# Patient Record
Sex: FEMALE | Race: BLACK OR AFRICAN AMERICAN
[De-identification: names, ages, dates, MRNs, and addresses within clinical notes are randomized per-mention and may not be internally consistent; named-entity substitution may affect disease eponyms.]

---

## 2017-06-29 ENCOUNTER — HOSPITAL ENCOUNTER (INPATIENT)
Dept: HOSPITAL 25 - MCHOBOUT | Age: 22
LOS: 3 days | Discharge: HOME | DRG: 560 | End: 2017-07-02
Attending: MIDWIFE | Admitting: MIDWIFE
Payer: COMMERCIAL

## 2017-06-29 DIAGNOSIS — Z3A.40: ICD-10-CM

## 2017-06-29 DIAGNOSIS — O48.0: Primary | ICD-10-CM

## 2017-06-29 DIAGNOSIS — Z91.018: ICD-10-CM

## 2017-06-29 LAB
HCT VFR BLD AUTO: 33 % (ref 35–47)
HGB BLD-MCNC: 10.6 G/DL (ref 12–16)
MCH RBC QN AUTO: 26 PG (ref 27–31)
MCHC RBC AUTO-ENTMCNC: 32 G/DL (ref 31–36)
MCV RBC AUTO: 81 FL (ref 80–97)
RBC # BLD AUTO: 4.04 10^6/UL (ref 4–5.4)
ROM INTERNAL QC: (no result)
WBC # BLD AUTO: 17.4 10^3/UL (ref 3.5–10.8)

## 2017-06-29 PROCEDURE — 84112 EVAL AMNIOTIC FLUID PROTEIN: CPT

## 2017-06-29 PROCEDURE — 86900 BLOOD TYPING SEROLOGIC ABO: CPT

## 2017-06-29 PROCEDURE — 85025 COMPLETE CBC W/AUTO DIFF WBC: CPT

## 2017-06-29 PROCEDURE — 86850 RBC ANTIBODY SCREEN: CPT

## 2017-06-29 PROCEDURE — 86901 BLOOD TYPING SEROLOGIC RH(D): CPT

## 2017-06-29 PROCEDURE — 36415 COLL VENOUS BLD VENIPUNCTURE: CPT

## 2017-06-30 PROCEDURE — 0HQ9XZZ REPAIR PERINEUM SKIN, EXTERNAL APPROACH: ICD-10-PCS | Performed by: MIDWIFE

## 2017-06-30 PROCEDURE — 4A1HXCZ MONITORING OF PRODUCTS OF CONCEPTION, CARDIAC RATE, EXTERNAL APPROACH: ICD-10-PCS | Performed by: MIDWIFE

## 2017-06-30 RX ADMIN — IBUPROFEN PRN MG: 600 TABLET, FILM COATED ORAL at 09:51

## 2017-06-30 RX ADMIN — DOCUSATE SODIUM SCH MG: 100 CAPSULE, LIQUID FILLED ORAL at 09:52

## 2017-06-30 RX ADMIN — DOCUSATE SODIUM SCH MG: 100 CAPSULE, LIQUID FILLED ORAL at 14:22

## 2017-06-30 RX ADMIN — IBUPROFEN PRN MG: 600 TABLET, FILM COATED ORAL at 21:25

## 2017-06-30 RX ADMIN — DOCUSATE SODIUM SCH MG: 100 CAPSULE, LIQUID FILLED ORAL at 21:25

## 2017-06-30 RX ADMIN — IBUPROFEN PRN MG: 600 TABLET, FILM COATED ORAL at 15:47

## 2017-07-01 LAB
HCT VFR BLD AUTO: 28 % (ref 35–47)
HGB BLD-MCNC: 9.2 G/DL (ref 12–16)
MCH RBC QN AUTO: 26 PG (ref 27–31)
MCHC RBC AUTO-ENTMCNC: 33 G/DL (ref 31–36)
MCV RBC AUTO: 80 FL (ref 80–97)
RBC # BLD AUTO: 3.5 10^6/UL (ref 4–5.4)
WBC # BLD AUTO: 16.4 10^3/UL (ref 3.5–10.8)

## 2017-07-01 RX ADMIN — DOCUSATE SODIUM SCH MG: 100 CAPSULE, LIQUID FILLED ORAL at 08:09

## 2017-07-01 RX ADMIN — DOCUSATE SODIUM SCH: 100 CAPSULE, LIQUID FILLED ORAL at 14:13

## 2017-07-01 RX ADMIN — Medication SCH: at 09:42

## 2017-07-01 RX ADMIN — IBUPROFEN PRN MG: 600 TABLET, FILM COATED ORAL at 22:09

## 2017-07-01 RX ADMIN — DOCUSATE SODIUM SCH MG: 100 CAPSULE, LIQUID FILLED ORAL at 22:09

## 2017-07-01 RX ADMIN — IBUPROFEN PRN MG: 600 TABLET, FILM COATED ORAL at 16:12

## 2017-07-01 RX ADMIN — Medication SCH MG: at 22:09

## 2017-07-01 RX ADMIN — IBUPROFEN PRN MG: 600 TABLET, FILM COATED ORAL at 08:09

## 2017-07-02 VITALS — SYSTOLIC BLOOD PRESSURE: 103 MMHG | DIASTOLIC BLOOD PRESSURE: 70 MMHG

## 2017-07-02 RX ADMIN — IBUPROFEN PRN MG: 600 TABLET, FILM COATED ORAL at 08:25

## 2017-07-02 RX ADMIN — Medication SCH MG: at 08:25

## 2017-07-02 RX ADMIN — DOCUSATE SODIUM SCH MG: 100 CAPSULE, LIQUID FILLED ORAL at 08:25

## 2017-07-02 NOTE — PTEDU
Patient Name: GUANACO ZARATE

MRN: [id number]



GUANACO ZARATE selected video: Never Ever Shake a Baby to view on 07/02/2017 at 8:42:50 AM [facility]
[id number]

## 2017-08-11 ENCOUNTER — HOSPITAL ENCOUNTER (EMERGENCY)
Dept: HOSPITAL 25 - UCEAST | Age: 22
Discharge: HOME | End: 2017-08-11
Payer: COMMERCIAL

## 2017-08-11 VITALS — SYSTOLIC BLOOD PRESSURE: 103 MMHG | DIASTOLIC BLOOD PRESSURE: 53 MMHG

## 2017-08-11 DIAGNOSIS — J06.9: Primary | ICD-10-CM

## 2017-08-11 PROCEDURE — G0463 HOSPITAL OUTPT CLINIC VISIT: HCPCS

## 2017-08-11 PROCEDURE — 99211 OFF/OP EST MAY X REQ PHY/QHP: CPT

## 2017-08-11 NOTE — UC
Respiratory Complaint HPI





- HPI Summary


HPI Summary: 


2 DAYS OF CONGESTION, COUGH, RHINITIS. SUBJECTIVE FEVER LAST NIGHT. NO N/V/D.





- History of Current Complaint


Chief Complaint: UCRespiratory


Stated Complaint: RESP ISSUE FEVER


Time Seen by Provider: 08/11/17 10:53


Hx Obtained From: Patient


Hx Last Menstrual Period: 10/20/15


Onset/Duration: Gradual Onset, Lasting Days, Still Present


Timing: Constant


Severity Initially: Moderate


Severity Currently: Moderate


Pain Intensity: 0


Pain Scale Used: 0-10 Numeric


Character: Cough: Nonproductive


Aggravating Factors: Nothing


Alleviating Factors: Nothing


Associated Signs And Symptoms: Positive: Fever, Chills, URI, Nasal Congestion.  

Negative: Wheezing





- Allergies/Home Medications


Allergies/Adverse Reactions: 


 Allergies











Allergy/AdvReac Type Severity Reaction Status Date / Time


 


Kiwi Extract Allergy Severe tongue Verified 08/11/17 10:31





   swelling  











Home Medications: 


 Home Medications





Phenylephrine-Doxylamine-Dextr [Nyquil Severe Cold/Flu 5-6.25- mg/15Ml]  

08/11/17 [History]











PMH/Surg Hx/FS Hx/Imm Hx


Previously Healthy: Yes





- Surgical History


Surgical History: None





- Family History


Known Family History: Positive: Diabetes





- Social History


Alcohol Use: None


Substance Use Type: None


Smoking Status (MU): Never Smoked Tobacco





- Immunization History


Most Recent Influenza Vaccination: currently not flu season


Most Recent Pneumonia Vaccination: not indicated





Review of Systems


Constitutional: Fever, Chills


ENT: Nasal Discharge


Respiratory: Cough


Cardiovascular: Negative


Gastrointestinal: Negative


Genitourinary: Negative


All Other Systems Reviewed And Are Negative: Yes





Physical Exam


Triage Information Reviewed: Yes


Appearance: Well-Appearing, No Pain Distress, Well-Nourished


Vital Signs: 


 Initial Vital Signs











Temp  98 F   08/11/17 10:31


 


Pulse  78   08/11/17 10:31


 


Resp  16   08/11/17 10:31


 


BP  103/53   08/11/17 10:31


 


Pulse Ox  100   08/11/17 10:31











Vital Signs Reviewed: Yes


Eyes: Positive: Conjunctiva Clear


ENT: Positive: Hearing grossly normal, Pharynx normal, TMs normal


Neck: Positive: Supple, Nontender, No Lymphadenopathy


Respiratory Exam: Normal


Cardiovascular Exam: Normal


Abdomen Description: Positive: Soft


Musculoskeletal: Positive: No Edema


Neurological: Positive: Alert


Psychological: Positive: Age Appropriate Behavior


Skin: Negative: rashes





UC Diagnostic Evaluation





- Laboratory


O2 Sat by Pulse Oximetry: 100





Respiratory Course/Dx





- Differential Dx/Diagnosis


Provider Diagnoses: ACUTE URI





Discharge





- Discharge Plan


Condition: Stable


Disposition: HOME


Patient Education Materials:  Upper Respiratory Infection (ED)


Referrals: 


Vanessa Ennis CNM [Certified Midwife] - If Needed


Additional Instructions: 


ACUTE UPPER RESPIRATORY INFECTION


The common cold is a benign self-limited syndrome representing a group of 

diseases caused by members of several families of viruses. It is the most 

frequent acute illness in the [place] and throughout the industrialized 

world. The term "common cold" refers to a mild upper respiratory viral 

infection involving, to variable degrees, nasal congestion and discharge (

rhinorrhea), sneezing, sore throat, cough, low-grade fever, headache, and 

malaise.


Symptomatic therapy remains the mainstay of common cold treatment. In the 

absence of convincing evidence of a secondary bacterial infection, antibiotics 

are not effective in the treatment of the common cold and should not be 

prescribed. 


Be advised that the usual course and duration of illness is up to one and a 

half weeks for patients with a cold, but can last slightly longer; symptoms 

usually persist longer in smokers.

## 2019-07-31 ENCOUNTER — HOSPITAL ENCOUNTER (EMERGENCY)
Dept: HOSPITAL 25 - UCEAST | Age: 24
Discharge: HOME | End: 2019-07-31
Payer: SELF-PAY

## 2019-07-31 VITALS — DIASTOLIC BLOOD PRESSURE: 69 MMHG | SYSTOLIC BLOOD PRESSURE: 105 MMHG

## 2019-07-31 DIAGNOSIS — J02.8: Primary | ICD-10-CM

## 2019-07-31 PROCEDURE — 99211 OFF/OP EST MAY X REQ PHY/QHP: CPT

## 2019-07-31 PROCEDURE — G0463 HOSPITAL OUTPT CLINIC VISIT: HCPCS

## 2019-07-31 PROCEDURE — 87651 STREP A DNA AMP PROBE: CPT

## 2019-07-31 NOTE — UC
Throat Pain/Nasal Pee HPI





- HPI Summary


HPI Summary: 


24-year-old female presents with onset of sore throat and fatigue yesterday.  

Associated with subjective fever and chills.  Denies ear pain, nasal congestion

, dysphagia, cough, abdominal pain, nausea, or vomiting. 








- History of Current Complaint


Chief Complaint: UCGeneralIllness


Stated Complaint: FATIGUE/WEAK/CHILLS/ST


Time Seen by Provider: 07/31/19 14:56


Hx Obtained From: Patient


Hx Last Menstrual Period: 7/20/19


Pain Intensity: 7





- Allergies/Home Medications


Allergies/Adverse Reactions: 


 Allergies











Allergy/AdvReac Type Severity Reaction Status Date / Time


 


kiwi Allergy  Swelling Verified 07/31/19 14:12











Home Medications: 


 Home Medications





NK [No Home Medications Reported]  07/31/19 [History Confirmed 07/31/19]











PMH/Surg Hx/FS Hx/Imm Hx


Previously Healthy: Yes - Denies significant PMH





- Surgical History


Surgical History: None





- Family History


Known Family History: Positive: Diabetes





- Social History


Occupation: Employed Full-time


Lives: With Family


Alcohol Use: None


Substance Use Type: None


Smoking Status (MU): Never Smoked Tobacco





- Immunization History


Most Recent Influenza Vaccination: currently not flu season


Most Recent Pneumonia Vaccination: not indicated





Review of Systems


All Other Systems Reviewed And Are Negative: Yes


Constitutional: Positive: Fever - subjective, Chills, Fatigue


Skin: Negative: Rash


Eyes: Negative: Drainage, Eye Redness


ENT: Positive: Sore Throat


Respiratory: Negative: Shortness Of Breath, Cough


Cardiovascular: Positive: Negative


Gastrointestinal: Positive: Negative


Genitourinary: Positive: Negative


Musculoskeletal: Positive: Negative


Neurological: Positive: Negative


Is Patient Immunocompromised?: No





Physical Exam





- Summary


Physical Exam Summary: 


GENERAL APPEARANCE: Well developed, well nourished, alert and cooperative, and 

appears to be in no acute distress.





EYES: Conjunctiva clear. No drainage.





EARS: External auditory canals and tympanic membranes clear, hearing grossly 

intact.





NOSE: No nasal discharge.





THROAT: Pharyngeal erythema. 2+ tonsils without exudate or lesions. Uvula 

midline.





NECK: Neck supple, non-tender without lymphadenopathy.





CARDIAC: Normal S1 and S2. No S3, S4 or murmurs. Rhythm is regular. There is no 

peripheral edema, cyanosis or pallor. Extremities are warm and well perfused. 

Capillary refill is less than 2 seconds. Peripheral pulses intact.





LUNGS: Clear to auscultation without rales, rhonchi, wheezing or diminished 

breath sounds.





ABDOMEN: Positive bowel sounds. Soft, nondistended, nontender. No guarding or 

rebound. No masses or hepatosplenomegally.





MUSKULOSKELETAL: ROM intact to all extremities. No joint erythema or 

tenderness. Normal muscular development. Normal gait.





SKIN: Skin normal color, texture and turgor with no lesions or eruptions.





Triage Information Reviewed: Yes


Vital Signs: 


 Initial Vital Signs











Temp  98.4 F   07/31/19 14:13


 


Pulse  76   07/31/19 14:13


 


Resp  16   07/31/19 14:13


 


BP  105/69   07/31/19 14:13


 


Pulse Ox  100   07/31/19 14:13











Vital Signs Reviewed: Yes





Throat Pain/Nasal Course/Dx





- Course


Course Of Treatment: 


24-year-old female presents with onset of sore throat and fatigue yesterday.  

Associated with subjective fever and chills.  Denies ear pain, nasal congestion

, dysphagia, cough, abdominal pain, nausea, or vomiting.  Afebrile.  Vital 

signs stable.  Patient had pharyngeal erythema, 2+ tonsils without exudate or 

lesions, no cervical lymphadenopathy, otherwise unremarkable exam.  Rapid strep 

test was negative.  Recommending symptomatic treatment for a viral pharyngitis.

  She is to return here or with her primary care provider in 5-7 days if 

symptoms are not improving.  Anticipatory guidance and warning symptoms were 

reviewed with the patient.  Verbalizes understanding and agrees with plan of 

care.








- Differential Dx/Diagnosis


Differential Diagnosis/HQI/PQRI: Mononucleosis, Pharyngitis, Tonsillitis, URI


Provider Diagnosis: 


 Acute viral pharyngitis








Discharge





- Sign-Out/Discharge


Documenting (check all that apply): Patient Departure


All imaging exams completed and their final reports reviewed: No Studies





- Discharge Plan


Condition: Stable


Disposition: HOME


Patient Education Materials:  Pharyngitis (ED)


Forms:  *Work Release


Referrals: 


No Primary Care Phys,NOPCP [Primary Care Provider] - 


Additional Instructions: 


Your rapid strep test in the clinic today was negative. Your symptoms are 

likely from a viral infection. Viral infections do not respond to antibiotics 

and are limited to the treatment of symptoms. Viral infections typically run 

their course in 7-10 days.





Drink plenty of fluids to avoid dehydration especially if you are running any 

fever.





Use salt water gargles several times a day.





Take over the counter acetaminophen (Tylenol) or ibuprofen (Advil, Motrin) 

according to directions as needed for pain or fever.





You may also use Chloraseptic spray or Cepacol lonzenges according to 

directions which contain a numbing medication and can provide some temporary 

relief from your sore throat.





Return here or follow up with your primary care provider in 5-7 days if 

symptoms persist.





Seek immediate medical attention in the emergency room if you have fever 

greater than 100.5 F despite taking acetaminophen or ibuprofen, are unable to 

swallow or develop drooling, are unable to open your mouth fully, are unable to 

eat or drink, have pain that is not relieved with over the counter pain 

medication, have any difficulty breathing, r have any worsening of symptoms.





- Billing Disposition and Condition


Condition: STABLE


Disposition: Home

## 2020-03-10 ENCOUNTER — HOSPITAL ENCOUNTER (EMERGENCY)
Dept: HOSPITAL 25 - UCEAST | Age: 25
Discharge: HOME | End: 2020-03-10
Payer: SELF-PAY

## 2020-03-10 VITALS — SYSTOLIC BLOOD PRESSURE: 102 MMHG | DIASTOLIC BLOOD PRESSURE: 69 MMHG

## 2020-03-10 DIAGNOSIS — J20.9: Primary | ICD-10-CM

## 2020-03-10 DIAGNOSIS — J06.9: ICD-10-CM

## 2020-03-10 DIAGNOSIS — Z91.018: ICD-10-CM

## 2020-03-10 PROCEDURE — G0463 HOSPITAL OUTPT CLINIC VISIT: HCPCS

## 2020-03-10 PROCEDURE — 99212 OFFICE O/P EST SF 10 MIN: CPT

## 2020-03-10 NOTE — UC
Respiratory Complaint HPI





- HPI Summary


HPI Summary: 


Patient is a 25-year-old female presenting with fianc and daughter for 

complaint of cough 1 week.  Patient states illness began with fever for the 

first day but denies fever since.  States cough is nonproductive.  Denies 

shortness of breath and wheezing.  Does note that occasionally her chest feels 

tight when she coughs.  Denies chest pain.  Notes mild nasal congestion.  

Denies sore throat.  Denies chills and body aches.  Taking OTC cough 

medications with little relief.  Patient states she feels fatigued and that is 

what is concerning her most.  Also states that her fianc and daughter recently 

tested positive for the flu.  Patient denies concern for the flu today.








- History of Current Complaint


Chief Complaint: UCRespiratory


Stated Complaint: COUGH


Hx Obtained From: Patient


Hx Last Menstrual Period: 2/22/20


Pain Intensity: 0





- Allergies/Home Medications


Allergies/Adverse Reactions: 


 Allergies











Allergy/AdvReac Type Severity Reaction Status Date / Time


 


kiwi Allergy  Swelling Verified 03/10/20 15:18











Home Medications: 


 Home Medications





Albuterol HFA INHALER* [Ventolin HFA Inhaler*] 1 - 2 puff INH Q6H PRN #1 mdi 03/

10/20 [Rx]











PMH/Surg Hx/FS Hx/Imm Hx


Previously Healthy: Yes





- Surgical History


Surgical History: None





- Family History


Known Family History: Positive: Diabetes





- Social History


Alcohol Use: None


Substance Use Type: None


Smoking Status (MU): Never Smoked Tobacco





- Immunization History


Most Recent Influenza Vaccination: currently not flu season


Most Recent Pneumonia Vaccination: not indicated





Review of Systems


All Other Systems Reviewed And Are Negative: Yes


Constitutional: Positive: Fatigue


ENT: Positive: Sinus Congestion


Respiratory: Positive: Cough.  Negative: Shortness Of Breath


Cardiovascular: Positive: Negative


Gastrointestinal: Positive: Negative


Musculoskeletal: Positive: Negative


Neurological/Mental Status: Positive: Negative





Physical Exam





- Summary


Physical Exam Summary: 


Vital Signs Reviewed: Yes


A+Ox3, no distress, well-appearing


Eyes: Conjunctiva Clear


ENT: Hearing grossly normal, TM x 2 clear, moist, uvula midline, no exudate, no 

erythema


Neck: Positive: Supple


Respiratory: Positive: No respiratory distress, No accessory muscle use + CTA 

throughout  no w/r


Cardiovascular: RRR  nl s1, s2  no m/r 


Musculoskeletal Exam: BIANCHI x 4 without difficulty


Neurological: Positive: Alert


Psychological: Positive: age appropriate behavior


Skin: Positive: no rash, no ecchymosis








Vital Signs: 


 Initial Vital Signs











Temp  98.8 F   03/10/20 15:13


 


Pulse  68   03/10/20 15:13


 


Resp  16   03/10/20 15:13


 


BP  102/69   03/10/20 15:13


 


Pulse Ox  98   03/10/20 15:13














Respiratory Course/Dx





- Course


Course Of Treatment: 


Patient declined flu tests.  I discussed likely viral bronchitis with patient 

and provided her with an inhaler for any shortness of breath or chest 

tightness.  Instructed to continue with OTC decongestant and to maintain 

hydration.  Instructed follow up with PCP or [facility] clinic if 

symptoms persist or worsen.  Patient was understanding and agreed with the 

treatment plan.








- Differential Dx/Diagnosis


Provider Diagnosis: 


 Acute bronchitis, Viral URI








Discharge ED





- Sign-Out/Discharge


Documenting (check all that apply): Patient Departure


All imaging exams completed and their final reports reviewed: No Studies





- Discharge Plan


Condition: Stable


Disposition: HOME


Prescriptions: 


Albuterol HFA INHALER* [Ventolin HFA Inhaler*] 1 - 2 puff INH Q6H PRN #1 mdi


 PRN Reason: Sob/Wheezing


Patient Education Materials:  Acute Bronchitis (ED)


Referrals: 


[facility] [Outside] - If Needed


Additional Instructions: 


As discussed, your symptoms are most likely caused by a virus and should 

resolve without treatment.


You may take an over the counter decongestant to help alleviate mucous 

production.


Use the inhaler as needed for shortness of breath.


Get plenty of rest and increase your fluid intake. A humidifier at night may 

also help alleviate symptoms.


Follow up with your primary care provider of the [facility] clinic below 

if your symptoms worsen or do not improve within 7 days.








- Billing Disposition and Condition


Condition: STABLE


Disposition: Home